# Patient Record
Sex: FEMALE | Race: BLACK OR AFRICAN AMERICAN | NOT HISPANIC OR LATINO | Employment: OTHER | URBAN - METROPOLITAN AREA
[De-identification: names, ages, dates, MRNs, and addresses within clinical notes are randomized per-mention and may not be internally consistent; named-entity substitution may affect disease eponyms.]

---

## 2018-07-09 ENCOUNTER — TELEPHONE (OUTPATIENT)
Dept: TRANSPLANT | Facility: CLINIC | Age: 68
End: 2018-07-09

## 2018-07-25 DIAGNOSIS — Z76.82 ORGAN TRANSPLANT CANDIDATE: Primary | ICD-10-CM

## 2018-08-09 ENCOUNTER — HOSPITAL ENCOUNTER (OUTPATIENT)
Dept: RADIOLOGY | Facility: HOSPITAL | Age: 68
Discharge: HOME | End: 2018-08-09
Attending: NURSE PRACTITIONER
Payer: MEDICARE

## 2018-08-09 ENCOUNTER — OFFICE VISIT (OUTPATIENT)
Dept: TRANSPLANT | Facility: CLINIC | Age: 68
End: 2018-08-09
Payer: MEDICARE

## 2018-08-09 VITALS
TEMPERATURE: 98 F | HEART RATE: 61 BPM | RESPIRATION RATE: 18 BRPM | SYSTOLIC BLOOD PRESSURE: 154 MMHG | HEIGHT: 67 IN | OXYGEN SATURATION: 100 % | BODY MASS INDEX: 27.2 KG/M2 | WEIGHT: 173.31 LBS | DIASTOLIC BLOOD PRESSURE: 67 MMHG

## 2018-08-09 DIAGNOSIS — Z01.818 PRE-TRANSPLANT EVALUATION FOR CHRONIC KIDNEY DISEASE: Primary | ICD-10-CM

## 2018-08-09 DIAGNOSIS — Z76.82 ORGAN TRANSPLANT CANDIDATE: ICD-10-CM

## 2018-08-09 DIAGNOSIS — N18.5 CKD (CHRONIC KIDNEY DISEASE), STAGE V: ICD-10-CM

## 2018-08-09 PROCEDURE — 97802 MEDICAL NUTRITION INDIV IN: CPT | Mod: S$GLB,TXP,, | Performed by: DIETITIAN, REGISTERED

## 2018-08-09 PROCEDURE — 71046 X-RAY EXAM CHEST 2 VIEWS: CPT | Mod: TC,TXP

## 2018-08-09 PROCEDURE — 93978 VASCULAR STUDY: CPT | Mod: 26,TXP,, | Performed by: RADIOLOGY

## 2018-08-09 PROCEDURE — 93978 VASCULAR STUDY: CPT | Mod: TC,TXP

## 2018-08-09 PROCEDURE — 71046 X-RAY EXAM CHEST 2 VIEWS: CPT | Mod: 26,TXP,, | Performed by: RADIOLOGY

## 2018-08-09 PROCEDURE — 72170 X-RAY EXAM OF PELVIS: CPT | Mod: TC,TXP

## 2018-08-09 PROCEDURE — 76770 US EXAM ABDO BACK WALL COMP: CPT | Mod: 26,TXP,, | Performed by: RADIOLOGY

## 2018-08-09 PROCEDURE — 76770 US EXAM ABDO BACK WALL COMP: CPT | Mod: TC,TXP

## 2018-08-09 PROCEDURE — 99244 OFF/OP CNSLTJ NEW/EST MOD 40: CPT | Mod: S$GLB,TXP,, | Performed by: TRANSPLANT SURGERY

## 2018-08-09 PROCEDURE — 99205 OFFICE O/P NEW HI 60 MIN: CPT | Mod: S$GLB,TXP,, | Performed by: INTERNAL MEDICINE

## 2018-08-09 PROCEDURE — 99999 PR PBB SHADOW E&M-EST. PATIENT-LVL V: CPT | Mod: PBBFAC,TXP,, | Performed by: INTERNAL MEDICINE

## 2018-08-09 PROCEDURE — 99204 OFFICE O/P NEW MOD 45 MIN: CPT | Mod: S$GLB,TXP,, | Performed by: PHYSICIAN ASSISTANT

## 2018-08-09 PROCEDURE — 72170 X-RAY EXAM OF PELVIS: CPT | Mod: 26,TXP,, | Performed by: RADIOLOGY

## 2018-08-09 RX ORDER — FERROUS SULFATE 325(65) MG
325 TABLET ORAL DAILY
COMMUNITY

## 2018-08-09 RX ORDER — TIZANIDINE HYDROCHLORIDE 4 MG/1
1 CAPSULE, GELATIN COATED ORAL DAILY
COMMUNITY

## 2018-08-09 RX ORDER — CHOLECALCIFEROL (VITAMIN D3) 25 MCG
5000 TABLET ORAL DAILY
COMMUNITY

## 2018-08-09 RX ORDER — FUROSEMIDE 40 MG/1
40 TABLET ORAL 2 TIMES DAILY
COMMUNITY

## 2018-08-09 RX ORDER — OXYCODONE AND ACETAMINOPHEN 7.5; 325 MG/1; MG/1
1 TABLET ORAL 2 TIMES DAILY PRN
COMMUNITY

## 2018-08-09 RX ORDER — SODIUM BICARBONATE 650 MG/1
650 TABLET ORAL 2 TIMES DAILY
COMMUNITY

## 2018-08-09 RX ORDER — PRAVASTATIN SODIUM 20 MG/1
20 TABLET ORAL NIGHTLY
COMMUNITY

## 2018-08-09 RX ORDER — CALCITRIOL 0.5 UG/1
0.5 CAPSULE ORAL DAILY
COMMUNITY

## 2018-08-09 RX ORDER — DULOXETIN HYDROCHLORIDE 60 MG/1
60 CAPSULE, DELAYED RELEASE ORAL DAILY
COMMUNITY

## 2018-08-09 RX ORDER — GABAPENTIN 600 MG/1
600 TABLET ORAL DAILY
COMMUNITY

## 2018-08-09 NOTE — PROGRESS NOTES
Transplant Surgery  Kidney Transplant Recipient Evaluation    Referring Physician: Aidan Dickson  Current Nephrologist: Aidan Dickson    Subjective:     Reason for Visit: evaluate transplant candidacy    History of Present Illness: Danielle Rm is a 68 y.o. year old female undergoing transplant evaluation.    Dialysis History: Danielle is pre-dialysis.      Transplant History: N/A    Etiology of Renal Disease: Diabetes Mellitus - Type Other / Unknown (based on medical records from referral).    Review of Systems   Constitutional: Negative for activity change, appetite change, chills, fatigue and fever.   HENT: Negative for sore throat and trouble swallowing.    Eyes: Negative for visual disturbance.   Respiratory: Negative for cough, chest tightness and shortness of breath.    Cardiovascular: Positive for leg swelling. Negative for chest pain and palpitations.   Gastrointestinal: Negative for abdominal distention, abdominal pain, blood in stool, constipation, diarrhea, nausea and vomiting.   Endocrine: Negative for polyuria.   Genitourinary: Negative for decreased urine volume, difficulty urinating, dysuria, flank pain, frequency and hematuria.   Musculoskeletal: Negative for gait problem, myalgias and neck stiffness.   Skin: Negative for rash and wound.   Neurological: Negative for dizziness, tremors, seizures, weakness, light-headedness and headaches.   Hematological: Negative for adenopathy.   Psychiatric/Behavioral: Negative for agitation, confusion and sleep disturbance.       Objective:     Physical Exam:  Constitutional:   Vitals reviewed: yes   Well-nourished and well-groomed: yes  Eyes:   Sclerae icteric: no   Extraocular movements intact: yes  GI:    Bowel sounds normal: yes   Tenderness: no    If yes, quadrant/location: not applicable   Palpable masses: no    If yes, quadrant/location: not applicable   Hepatosplenomegaly: no   Ascites: no   Hernia: no    If yes, type/location: not  applicable   Surgical scars: yes    If yes, type/location: midline (open gastric bypsass), paniculectomy  Resp:   Effort normal: yes   Breath sounds normal: yes    CV:   Regular rate and rhythm: yes   Heart sounds normal: yes   Femoral pulses normal: yes   Extremities edematous: yes  Skin:   Rashes or lesions present: no    If yes, describe:not applicable   Jaundice:: no    Musculoskeletal:   Gait normal: yes   Strength normal: yes  Psych:   Oriented to person, place, and time: yes   Affect and mood normal: yes    Additional comments: not applicable    Counseling: We provided Danielle Rm with a group education session today.  We discussed kidney transplantation at length with her, including risks, potential complications, and alternatives in the management of her renal failure.  The discussion included complications related to anesthesia, bleeding, infection, primary nonfunction, and ATN.  I discussed the typical postoperative course, length of hospitalization, the need for long-term immunosuppression, and the need for long-term routine follow-up.  I discussed living-donor and -donor transplantation and the relative advantages and disadvantages of each.  I also discussed average waiting times for both living donation and  donation.  I discussed national and center-specific survival rates.  I also mentioned the potential benefit of multicenter listing to candidates listed with centers within more than one organ procurement organization.  All questions were answered.    Final determination of transplant candidacy will be made once evaluation is complete and reviewed by the Kidney & Kidney/Pancreas Selection Committee.         Transplant Surgery - Candidacy   Assessment/Plan:   Danielle Rm is pre-dialysis with CKD stage 4 (GFR 15-29 mL/min). I see no surgical contraindication to placing a kidney transplant. Based on available information, Danielle Rm is a suitable kidney transplant candidate.      Sarah Hebert MD

## 2018-08-09 NOTE — PROGRESS NOTES
67 yo with biphasic iliacs bilaterally --> review with surgeon regarding need for non-contrast CT A/P

## 2018-08-09 NOTE — PROGRESS NOTES
Severe Extensive calcifications. Likely Prohibitive.     Need noncon CT pelvis and iliac doppler US.

## 2018-08-09 NOTE — PROGRESS NOTES
INITIAL PATIENT EDUCATION NOTE    Ms. Danielle Rm was seen in pre-kidney transplant clinic for evaluation for kidney, kidney/pancreas or pancreas only transplant.  The patient attended a group education session that discussed/reviewed the following aspects of transplantation: evaluation and selection committee process, [facility] waitlist management/multiple listings, types of organs offered (KDPI < 85%, KDPI > 85%, PHS increased risk, DCD), financial aspects, surgical procedures, dietary instruction pre- and post-transplant, health maintenance pre- and post-transplant, post-transplant hospitalization and outpatient follow-up, potential to participate in a research protocol, and medication management and side effects.  A question and answer session was provided after the presentation.    The patient was seen by all members of the multi-disciplinary team to include: Nephrologist/PA, Surgeon, , Transplant Coordinator, , Pharmacist and Dietician (if applicable).    The patient reviewed and signed all consents for evaluation which were witnessed and sent to scanning into the EPIC chart.    The patient was given an education book and plan for further evaluation based on her individual assessment.      The patient was encouraged to call with any questions or concerns.

## 2018-08-09 NOTE — PROGRESS NOTES
TRANSPLANT RECIPIENT EVALUATION    Requesting Physician: Dr. Randolph IBARRA       CC:   Initial evaluation of kidney transplant candidacy.    HPI:  Ms. Rm is a 68 y.o. year old Black or  female who has presented to be evaluated as a potential kidney transplant recipient.  She has advanced kidney disease secondary to diabetic nephropathy (biopsy proven). Patient is currently pre-dialysis. She has no access for dialysis access.  She urinates ~5-6 times a day. Her PMH is significant for DM X years ( improved after gastric bypass in 2004, on no med), anemia, low back pain s/p spinal cord stimulator, rotator cuff rupture,  DVT 2004 on coumadin, eliqius, She has cosmetic surgery (pinectomy) in Feb 2018, eliquis was held and patient developed the second DVT, started on Lovenox but developed bleeding, she required blood transfusion. So IVC filter was placed. She is off blood thinner med at this point.  Patient denies any history of coronary artery disease, stroke, seizure disorder, chronic obstructive pulmonary disease, liver disease,  pulmonary embolism, recurrent urinary tract infections or malignancies. she denies any CP, SOB orthopnea, nausea, vomiting, diarrhea, abdominal pain, cough, fever, chills, weight loss or night sweats. she has + numbness or paresthesias      Functional Status: She walks 4-5 blocks without any symptoms of chest pain, SOB, claudication.   Previous Transplant: no  Previous Blood Transfusion: yes  Previous Pregnancy: 4  Anticoagulation/ antiplatelet therapy and reason: no at this point  Potential Donor: yes, her son  High KDPI candidate: yes      Past Medical History:  Past Medical History:   Diagnosis Date    Anemia     Clotting disorder 2004    left lower ext    Diabetes mellitus, type 2     went away after gastric bypass    Disorder of kidney and ureter 2013    kidney biopsy: Diabetic glomerulopathy    DVT (deep venous thrombosis)     2004, 2018. Off blood thiner  med after bleeding    Low back pain     Metabolic bone disease        Past Surgical History:  Past Surgical History:   Procedure Laterality Date    CHOLECYSTECTOMY      GASTRIC BYPASS  2004    LENKA FILTER PLACEMENT  2018    HYSTERECTOMY  1985    SHOULDER SURGERY Bilateral 1998    Rotator cuff rupture    SPINAL CORD STIMULATOR IMPLANT  2015    for spinal cord stenosis         Family History:  Family History   Problem Relation Age of Onset    Cancer Mother         Ovarian Ca    Cancer Father         Prostate Ca    Diabetes Sister     Hypertension Sister     Kidney disease Brother     Heart disease Brother        Social History:  Social History     Social History    Marital status:      Spouse name: N/A    Number of children: 4    Years of education: N/A     Occupational History    Not on file.     Social History Main Topics    Smoking status: Never Smoker    Smokeless tobacco: Never Used    Alcohol use No    Drug use: No    Sexual activity: No     Other Topics Concern    Not on file     Social History Narrative    No narrative on file           Current Medication  Current Outpatient Prescriptions   Medication Sig Dispense Refill    calcitRIOL (ROCALTROL) 0.5 MCG Cap Take 0.5 mcg by mouth once daily.      CYANOCOBALAMIN, VITAMIN B-12, INJ Inject as directed. One injection monthly      DULoxetine (CYMBALTA) 60 MG capsule Take 60 mg by mouth once daily.      ferrous sulfate 325 mg (65 mg iron) Tab tablet Take 325 mg by mouth once daily.      furosemide (LASIX) 40 MG tablet Take 40 mg by mouth 2 (two) times daily.      gabapentin (NEURONTIN) 600 MG tablet Take 600 mg by mouth once daily.      oxyCODONE-acetaminophen (PERCOCET) 7.5-325 mg per tablet Take 1 tablet by mouth 2 (two) times daily as needed for Pain.      pravastatin (PRAVACHOL) 20 MG tablet Take 20 mg by mouth every evening.      sodium bicarbonate 650 MG tablet Take 650 mg by mouth 2 (two) times daily.       tiZANidine 4 mg Cap Take 1 capsule by mouth once daily.      vitamin D 1000 units Tab Take 5,000 Units by mouth once daily.      diphth,pertus,acell,,tetanus (BOOSTRIX) 2.5-8-5 Lf-mcg-Lf/0.5mL Susp Inject 0.5 mLs into the muscle once. for 1 dose 0.5 mL 0     No current facility-administered medications for this visit.        Allergy:       Review of Systems    Constitution: Positive for decreased appetite. Negative for chills, fever, weakness  Cardiovascular: Positive for leg swelling. Negative for chest pain and palpitations.   Respiratory: Negative for cough, hemoptysis, shortness of breath, sputum production and wheezing.    Endocrine: Negative for cold intolerance and heat intolerance.   Hematologic/Lymphatic: Negative for adenopathy. Does not bruise/bleed easily.   Skin: Negative for dry skin, itching, rash and suspicious lesions.   Musculoskeletal: Positive for back pain. Negative for joint pain, myalgias and neck pain.   Gastrointestinal: Negative for abdominal pain, constipation, diarrhea, heartburn, nausea and vomiting.   Genitourinary: Negative for dysuria, frequency, hematuria, hesitancy and urgency.   Neurological: Positive for paresthesias. Negative for dizziness, headaches and numbness.   Psychiatric/Behavioral: Negative for depression and memory loss. The patient does not have insomnia and is not nervous/anxious.    Allergic/Immunologic: Negative for environmental allergies, HIV exposure, hives and persistent infections.       OBJECTIVE       Body mass index is 27.13 kg/m².    Vitals:    08/09/18 0808   BP: (!) 154/67   Pulse: 61   Resp: 18   Temp: 97.9 °F (36.6 °C)       Physical Exam    Constitutional: She is oriented to person, place, and time. She appears well-developed and well-nourished. No distress.   Mouth/Throat: She has denture. No oral lesions.   Neck: Normal range of motion. Neck supple.   Cardiovascular: Normal rate, regular rhythm and normal heart sounds.  Exam reveals no gallop and no  friction rub.    No murmur heard.  Pulmonary/Chest: Effort normal and breath sounds normal. No respiratory distress. She has no wheezes. She has no rales.   Abdominal: Soft. Bowel sounds are normal. She exhibits no distension and no mass. There is no tenderness. There is no guarding.   Musculoskeletal: She exhibits +++ edema  Neurological: She is alert and oriented to person, place, and time.   Skin: Skin is warm and dry. She is not diaphoretic.   Psychiatric: She has a normal mood and affect. Her behavior is normal.         Labs: Reviewed with the patient    ASSESSMENT     1. Pre-transplant evaluation for chronic kidney disease    2. CKD (chronic kidney disease), stage V    3. Organ transplant candidate        PLAN     Transplant Candidacy:   After obtaining history and performing physical exam as well as reviewing available diagnostic studies, Ms. Rm is a suitable kidney transplant candidate.  Final determination of transplant candidacy will be made once workup is complete and reviewed by the selection committee.    Prior to Listing, will need the following items to be completed:  1. Standard serologies, cardiac and imaging studies   2. Cardiology  3. Obtain the record for her recent DVT and bleeding after cosmetic surgery  4. Hematology clearance regarding the plan for initiation of anticoag med ( she already follows with her hematologist)  5. PTH

## 2018-08-09 NOTE — PROGRESS NOTES
Transplant Recipient Adult Psychosocial Assessment    Danielle Rm  [address] [address]    Telephone Information:   Mobile [phone]   Home  [phone] (home)  Work  [phone] (work)  E-mail  [email or web address]    Sex: female  YOB: 1950  Age: 68 y.o.    Encounter Date: 8/9/2018  [place] Citizen: yes  Primary Language: English   Needed: no    Emergency Contact:  Name: Vu Rm  Relationship: spouse  Address: [address] [place] [address]  Phone Numbers:  [phone] (mobile)    Family/Social Support:   Number of dependents/: None   Marital history:  for 38 yrs  Other family dynamics: 3 sons; Angelo PATRICIA Rm (ph# [phone]), Rittman PATRICIA Rm (ph# [phone]) and Yonas Jag (ph# [phone]). Pt reported having siblings but that they won't be helping out with the transplant process since they aren't local. Pt reported son Fabian is located in [place] and will help out as needed.     Household Composition:  Name: Vu Rm  Age: did not assess  Relationship: spouse  Does person drive? yes    Do you and your caregivers have access to reliable transportation? yes  PRIMARY CAREGIVER: Vu Rm, , will be primary caregiver, phone number [phone].      provided in-depth information to patient and caregiver regarding pre- and post-transplant caregiver role.   strongly encourages patient and caregiver to have concrete plan regarding post-transplant care giving, including back-up caregiver(s) to ensure care giving needs are met as needed.    Patient and Caregiver states understanding all aspects of caregiver role/commitment and is able/willing/committed to being caregiver to the fullest extent necessary.    Patient and Caregiver verbalizes understanding of the education provided today and caregiver responsibilities.         remains available. Patient and Caregiver agree to contact   in a timely manner if concerns arise.      Able to take time off work without financial concerns: yes.     Additional Significant Others who will Assist with Transplant:  Name: Yonas Rm   Age: did not assess  City: [place] State: [place]  Relationship: son  Does person drive? yes    Living Will: no  Healthcare Power of : no  Advance Directives on file: <<no information> per medical record.  Verbally reviewed LW/HCPA information.   provided patient with copy of LW/HCPA documents and provided education on completion of forms.    Living Donors: Yes.  Name: Angeloelis Rm and Toribio Rm are interested in donating.    Highest Education Level: Associate/Bachelor Degree  Reading Ability: college  Reports difficulty with: seeing; Pt wears contacts  Learns Best By:  A combination of verbal and hands on instruction.     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   Patient is self employed, see above..    Spouse/Significant Other Employment:  is retired from being a .     Disabled: no    Monthly Income:  Salary/Wages: $2,100 but pt reports it might be less when taking into consideration her premium for Medicare.  reported receiving $1,339 monthly from jail.   Able to afford all costs now and if transplanted, including medications: yes  Patient and Caregiver verbalizes understanding of personal responsibilities related to transplant costs and the importance of having a financial plan to ensure that patients transplant costs are fully covered.      provided fundraising information/education.  Patient and Caregiver verbalizes understanding.   remains available.    Insurance:   Payor/Plan Subscr  Sex Relation Sub. Ins. ID Effective Group Num   1. HU[employer] MANAGE*  KAYLAH KAMINSKI 1950 Female  [id number] 1/1/18 [id number]                                   [address] [address]     Primary Insurance (for UNOS reporting): Public Insurance - Medicare FFS (Fee For Service)  Secondary Insurance (for UNOS reporting): None  Patient and Caregiver verbalizes clear understanding that patient may experience difficulty obtaining and/or be denied insurance coverage post-surgery. This includes and is not limited to disability insurance, life insurance, health insurance, burial insurance, long term care insurance, and other insurances.    Patient and Caregiver also reports understanding that future health concerns related to or unrelated to transplantation may not be covered by patient's insurance.  Resources and information provided and reviewed.      Patient and Caregiver provides verbal permission to release any necessary information to outside resources for patient care and discharge planning.  Resources and information provided are reviewed.      Dialysis Adherence:  Patient reports adherence to nephrologist, Dr. Dickson, medical plan and appointments.  Pt is not on dialysis.     Infusion Service: patient utilizing? no  Home Health: patient utilizing? yes Pt couldn't recall the name of the [employer] company but reported a nurse coming out for 8 weeks, ending in Feb, for wound care  DME: yes; cane, walker and shower chair but pt not currently using  Pulmonary/Cardiac Rehab: none   ADLS:  Pt reports being independent with all ADLS.    Adherence:   Pt reports high adherence to nephrologist. SWI called nephrologist's office to confirm but had to leave voicemail.  Adherence education and counseling provided.     Per History Section:  Past Medical History:   Diagnosis Date    Anemia     Clotting disorder 2004    left lower ext    Diabetes mellitus, type 2     went away after gastric bypass    Disorder of kidney and ureter 2013    kidney biopsy: Diabetic glomerulopathy    DVT (deep venous thrombosis)      2004, 2018. Off blood thiner med after bleeding    Low back pain     Metabolic bone disease      Social History   Substance Use Topics    Smoking status: Never Smoker    Smokeless tobacco: Never Used    Alcohol use No     History   Drug Use No     History   Sexual Activity    Sexual activity: No       Per Today's Psychosocial:  Tobacco: none, patient denies any use.  Alcohol: none, patient denies any use.  Illicit Drugs/Non-prescribed Medications: none, patient denies any use.    Patient states clear understanding of the potential impact of substance use as it relates to transplant candidacy and is aware of possible random substance screening.  Substance abstinence/cessation counseling, education and resources provided and reviewed.     Arrests/DWI/Treatment/Rehab: patient denies    Psychiatric History:    Mental Health: depression and anxiety  Psychiatrist/Counselor: Pt reports seeing a psychiatrist in the past  Medications:  none  Suicide/Homicide Issues: none   Safety at home: none    Knowledge: Patient states having clear understanding and realistic expectations regarding the potential risks and potential benefits of organ transplantation and organ donation, agrees to discuss with health care team members and support system members and to utilize available resources and express questions and/or concerns in order to further facilitate the pt informed decision-making.  Resources and information provided and reviewed.     Patient and Caregiver is aware of [facility]'s affiliation and/or partnership with agencies in home health care, LTAC, SNF, [facility], and other hospitals and clinics.    Understanding: Patient and Caregiver reports having a clear understanding of the many lifetime commitments involved with being a transplant recipient, including costs, compliance, medications, lab work, procedures, appointments, concrete and financial planning, preparedness, timely and appropriate communication of concerns,  "abstinence (ETOH, tobacco, illicit non-prescribed drugs), adherence to all health care team recommendations, support system and caregiver involvement, appropriate and timely resource utilization and follow-through, mental health counseling as needed/recommended, and patient and caregiver responsibilities.  Social Service Handbook, resources and detailed educational information provided and reviewed.  Educational information provided.    Patient and Caregiver also reports current and expected compliance with health care regime and states having a clear understanding of the importance of compliance.      Patient and Caregiver reports a clear understanding that risks and benefits may be involved with organ transplantation and with organ donation.      Patient and Caregiver also reports clear understanding that psychosocial risk factors may affect patient, and include but are not limited to feelings of depression, generalized anxiety, anxiety regarding dependence on others, post traumatic stress disorder, feelings of guilt and other emotional and/or mental concerns, and/or exacerbation of existing mental health concerns.  Detailed resources provided and discussed.     Patient and Caregiver agrees to access appropriate resources in a timely manner as needed and/or as recommended, and to communicate concerns appropriately.  Patient and Caregiver also reports a clear understanding of treatment options available.      reviewed education, provided additional information, and answered questions.    Feelings or Concerns: Pt reported concerns with not wanting to go on dialysis and being denied a kidney. SW normalized pt's feelings, provided support and educated pt on the process. Pt reported wanting to stay in the hospital for 6 day so her insurance would "cover everything". UMANG educated pt about how it's best to not be in the hospital after transplant to avoid jimmie any illnesses.     Coping: Pt reported " coping adequately with the help of listening to the gospel, confiding in her  and going to the [employer] and [facility].     Goals: Pt reported wanting to maintain her kidney, stay healthy and be with her grand babies.  Patient referred to Vocational Rehabilitation.    Interview Behavior: Patient and Caregiver presents as alert and oriented x 4, pleasant, well groomed, recall good, concentration/judgement good, average intelligence, calm, communicative, cooperative and asking and answering questions appropriately. Pt maintained good eye contact but pt's  would often keep his eyes closed during the assessment, but he still presented as alert and oriented as he would open his eyes to ask questions or clarify things.           Transplant Social Work - Candidacy  Assessment/Plan:     Psychosocial Suitability: Patient presents as a suitable candidate for kidney transplant at this time. Based on psychosocial risk factors, patient presents as low risk, due to having anxiety and depression under control, good support, caregiver plan, lodging, and transportation plans in place as well as reported current adequate funding.     Recommendations/Additional Comments: Pt resides over 1 hour away, local lodging options discussed. SWI recommends lodging at [employer] apartments. Fundraising options discussed and encouraged to assist pt with pay for cost of medications, food, gas and other transplant related needs. SWI recommends that pt remain aware of potential mental health concerns and contact the team if any concerns arise. SWI remains available to answer any question or concerns that arise as the pt moves through the transplant process. Assessment completed with supervision by SILVA Vinson.     Zulema Hill, MSW Intern

## 2018-08-09 NOTE — LETTER
August 9, 2018        Aidan Dickson  [address] [facility] [address]  Phone: [phone]  Fax: [phone]             [facility]  [address]  [facility] [address]  Phone: [phone]   Patient: Danielle Rm   MR Number: [id number]   YOB: 1950   Date of Visit: 8/9/2018       Dear Dr. Aidan Dickson    Thank you for referring Danielle Rm to me for evaluation. Attached you will find relevant portions of my assessment and plan of care.    If you have questions, please do not hesitate to call me. I look forward to following Danielle Rm along with you.    Sincerely,    Taryn Araya MD    Enclosure    If you would like to receive this communication electronically, please contact [email or web address] or [phone] to request V[employer]io Link access.    [employer] Link is a tool which provides read-only access to select patient information with whom you have a relationship. Its easy to use and provides real time access to review your patients record including encounter summaries, notes, results, and demographic information.    If you feel you have received this communication in error or would no longer like to receive these types of communications, please e-mail [email or web address]

## 2018-08-09 NOTE — PROGRESS NOTES
PHARM.D. PRE-TRANSPLANT NOTE:    This patient's medication therapy was evaluated as part of her pre-transplant evaluation.    The following pharmacologic concerns were noted: none      Current Outpatient Prescriptions   Medication Sig Dispense Refill    calcitRIOL (ROCALTROL) 0.5 MCG Cap Take 0.5 mcg by mouth once daily.      CYANOCOBALAMIN, VITAMIN B-12, INJ Inject as directed. One injection monthly      DULoxetine (CYMBALTA) 60 MG capsule Take 60 mg by mouth once daily.      ferrous sulfate 325 mg (65 mg iron) Tab tablet Take 325 mg by mouth once daily.      furosemide (LASIX) 40 MG tablet Take 40 mg by mouth 2 (two) times daily.      gabapentin (NEURONTIN) 600 MG tablet Take 600 mg by mouth once daily.      oxyCODONE-acetaminophen (PERCOCET) 7.5-325 mg per tablet Take 1 tablet by mouth 2 (two) times daily as needed for Pain.      pravastatin (PRAVACHOL) 20 MG tablet Take 20 mg by mouth every evening.      sodium bicarbonate 650 MG tablet Take 650 mg by mouth 2 (two) times daily.      tiZANidine 4 mg Cap Take 1 capsule by mouth once daily.      vitamin D 1000 units Tab Take 5,000 Units by mouth once daily.       No current facility-administered medications for this visit.          Currently Ms. Rm and  is responsible for preparing / administering this patient's medications on a daily basis.  I am available for consultation and can be contacted, as needed by the other members of the Kidney Transplant team.

## 2018-08-09 NOTE — PROGRESS NOTES
Pre Transplant Infectious Diseases Consult  Kidney Transplant Recipient Evaluation    Requesting Physician: Aidan Dickson MD    Reason for Visit:    Chief Complaint   Patient presents with    Kidney Transplant Evaluation     History of Present Illness  Danielle Rm is a 68 y.o. year old Black or  female with advanced Kidney disease currently being evaluated for Kidney transplant.  She is not yet on HD. Many years ago had kidney infection that was treated without recurrence. Patient denies any recent fever, chills, or infective illnesses.      1) Do you have a history of:   YES NO   Diabetes      [x] [x] Resolved after gastric bypass  Wound/ Foot Infection/Bone Infection   []        [x]     Surgical Removal of Spleen   []  [x]                  2) Have you had recurrent infections involving:             YES NO  Sinus infections  []         [x]   Sore Throat   []         [x]                 Prostate Infections  []         []              Bladder Infections  []         [x]                     Kidney Infections  []         [x]                               Intestinal Infections  []         [x]      Skin Infections   []         [x]       Reproductive Infections          []  [x]   Periodontal Disease  []         [x]        3)Have you ever had: YES     NO UNKNOWN      Chicken Pox   [x]         []  []   Shingles   []         [x]  []   Orolabial Herpes             []  [x]  []   Genital Herpes  []         [x]  []   Cytomegalovirus  []         [x]  []   Xander-Barr Virus  []         [x]  []   Genital Warts   []         [x]  []   Hepatitis A   []         [x]  []   Hepatitis B   []         [x]  []   Hepatitis C   []         [x]  []   Syphilis   []         [x]  []   Gonorrhea   []         [x]  []   Pelvic Inflammatory   Disease   []         [x]  []   Chlamydia Infection  []         [x]  []   Intestinal parasites   or worms   []         [x]  []   Fungal Infections  []         [x]  []   Blood Infections  []          [x]  []       Comment:       4) Have you ever been exposed   YES NO  To someone with tuberculosis?  []   [x]   If yes, what treatment did you receive:     5) What states have you lived in? [place], [place]    6) What countries have you visited for more than 2 weeks?    None                     YES NO  7) Did you have any associated infections?  []  [x]       8) Are you planning to travel outside the    [x]  []   [place] after your transplant?    9) Household                   YES NO  Do you have pets living in your house?    []         [x]   If yes, describe:     Do you spend time or live on a farm or     []         [x]   have livestock or other farm animals?  If yes, which ones:    Do you have a fish tank?          []  [x]       Do you have a litter box?      []         [x]     Do you fish or hunt?       [x]         []     Do you clean or skin fish or animals?    []         [x]     Do you consume raw or undercooked    []         [x]   meat, fish, or shellfish?      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          12)Do you garden or otherwise  YES NO   work in the soil?    [x]         []   13)Do you hike, camp, or spend     time in wooded areas?   []         [x]        14) The patient's immunization history was reviewed.    Have you ever received:  YES NO UNKNOWN DATES   Routine Childhood vaccines  [x]         []  []      Influenza vaccine   [x]  []  []    Pneumovax    [x]  []  [] approx 1 year    Tetanus-diptheria   []         [x]  []    Hepatitis A vaccine series       []  [x]  []    Hepatitis B vaccine series         []  [x]  []    Meningitis vaccine   []         [x]  []    Varicella vaccine   [x]         []  []        Based on the patients immunization history and serologies, immunizations were ordered:         Ordered  Not Ordered  Influenza Vaccine     []    []   Hepatitis A series at 0,  6 months   [x]    []   Hepatitis B seriesat 0, 1, and 6  months  []    [x]   Hepatitis B High Dose 0,1, and 6 months  []    []   Prevnar      [x]    []   Pneumovax      []    []    TDap       [x]    []    Zoster       []    [x]   Menactra      []    [x]            The patient was encouraged to contact us about any problems that may develop after immunization and possible side effects were reviewed.      Previous Transplant: no    Etiology of Kidney Disease: unknown etiology    Allergies: Patient has no known allergies.    There is no immunization history on file for this patient.  History reviewed. No pertinent past medical history.  Past Surgical History:   Procedure Laterality Date    GASTRIC BYPASS        Social History     Social History    Marital status:      Spouse name: N/A    Number of children: N/A    Years of education: N/A     Occupational History    Not on file.     Social History Main Topics    Smoking status: Not on file    Smokeless tobacco: Not on file    Alcohol use Not on file    Drug use: Unknown    Sexual activity: Not on file     Other Topics Concern    Not on file     Social History Narrative    No narrative on file       Review of Systems   Constitution: Positive for decreased appetite. Negative for chills, fever, weakness, malaise/fatigue, night sweats, weight gain and weight loss.   HENT: Negative for congestion, ear pain, hearing loss, hoarse voice, sore throat and tinnitus.    Eyes: Negative for blurred vision, redness and visual disturbance.   Cardiovascular: Positive for leg swelling. Negative for chest pain and palpitations.   Respiratory: Negative for cough, hemoptysis, shortness of breath, sputum production and wheezing.    Endocrine: Negative for cold intolerance and heat intolerance.   Hematologic/Lymphatic: Negative for adenopathy. Does not bruise/bleed easily.   Skin: Negative for dry skin, itching, rash and suspicious lesions.   Musculoskeletal: Positive for back pain. Negative for joint pain, myalgias and neck  pain.   Gastrointestinal: Negative for abdominal pain, constipation, diarrhea, heartburn, nausea and vomiting.   Genitourinary: Positive for flank pain. Negative for dysuria, frequency, hematuria, hesitancy and urgency.   Neurological: Positive for paresthesias. Negative for dizziness, headaches and numbness.   Psychiatric/Behavioral: Negative for depression and memory loss. The patient does not have insomnia and is not nervous/anxious.    Allergic/Immunologic: Negative for environmental allergies, HIV exposure, hives and persistent infections.     Physical Exam   Constitutional: She is oriented to person, place, and time. She appears well-developed and well-nourished. No distress.   HENT:   Head: Normocephalic and atraumatic.   Mouth/Throat: She has dentures (lower partial). No oral lesions. Abnormal dentition (some missing ). Dental caries (filled) present. No dental abscesses or lacerations.   Eyes: EOM are normal. Pupils are equal, round, and reactive to light.   Neck: Normal range of motion. Neck supple.   Cardiovascular: Normal rate, regular rhythm and normal heart sounds.  Exam reveals no gallop and no friction rub.    No murmur heard.  Pulmonary/Chest: Effort normal and breath sounds normal. No respiratory distress. She has no wheezes. She has no rales.   Abdominal: Soft. Bowel sounds are normal. She exhibits no distension and no mass. There is no tenderness. There is no guarding.   Musculoskeletal: She exhibits no edema (LE 4+ bl pitting).   Neurological: She is alert and oriented to person, place, and time.   Skin: Skin is warm and dry. She is not diaphoretic.   Psychiatric: She has a normal mood and affect. Her behavior is normal.     Diagnostics: No results found for: RPR  No results found for: CMVANTIBODIE  No results found for: HIV1X2  No results found for: HTLVIIIANTIB  No results found for: HEPBSAG  No results found for: HEPBCAB  No results found for: HEPCAB  No results found for: TOXOIGG  No  components found for: TOXOIGGINTER  No results found for: PLU6MUA  No results found for: ZZV0JDM  No results found for: VARICELLAZOS  No results found for: VARICELLAINT  No results found for: STRONGANTIGG  No results found for: EPSTEINBARRV  No results found for: HEPBSAB  No results found for: QUANTIFERON  No results found for: HEPAIGM  No results found for: PPD           Transplant Infectious Diseases - Candidacy    Assessment/Plan:     Transplant Candidacy: Based on available information, there are no identified significant barriers to transplantation from an infectious disease standpoint pending acceptable serologies.  Refer to ID clinic for any serologies that require further evaluation. Final determination of transplant candidacy will be made once evaluation is complete and reviewed by the Transplant Selection Committee.    BEL Mcneill  Vaccine Needs:  1. Hep A today and 6 months - rx given for second dose  2. High dose Hep B series - Rx given  3. Prevnar today  4. Tdap today       Counseling:I discussed with Danielle the risk for increased susceptibility to infections following transplantation including increased risk for infection right after transplant and if rejection should occur.  The patients has been counseled on the importance of vaccinations including but not limited to a yearly flu vaccine.  Specific guidance has been provided to the patient regarding the patients occupation, hobbies and activities to avoid future infectious complications including but not limited to avoiding undercooked meats and seafood, proper hygiene, and contact with animals.

## 2018-08-09 NOTE — PROGRESS NOTES
TRANSPLANT NUTRITIONAL ASSESSMENT    Referring Provider: Taryn Araya MD    Reason for Visit: Pre-kidney transplant work-up (pre-dialysis)    Age: 68 y.o.  Sex: female    Patient Active Problem List   Diagnosis    CKD (chronic kidney disease), stage V     Past Medical History:   Diagnosis Date    Anemia     Clotting disorder 2004    left lower ext    Diabetes mellitus, type 2     went away after gastric bypass    Disorder of kidney and ureter 2013    kidney biopsy: Diabetic glomerulopathy    DVT (deep venous thrombosis)     2004, 2018. Off blood thiner med after bleeding    Low back pain     Metabolic bone disease      Lab Results   Component Value Date    GLU 87 08/09/2018    K 5.5 (H) 08/09/2018    PHOS 5.7 (H) 08/09/2018    CHOL 123 08/09/2018    HDL 70 08/09/2018    TRIG 29 (L) 08/09/2018    ALBUMIN 3.3 (L) 08/09/2018    HGBA1C 5.0 08/09/2018    CALCIUM 8.4 (L) 08/09/2018       Current Outpatient Prescriptions   Medication Sig    calcitRIOL (ROCALTROL) 0.5 MCG Cap Take 0.5 mcg by mouth once daily.    CYANOCOBALAMIN, VITAMIN B-12, INJ Inject as directed. One injection monthly    DULoxetine (CYMBALTA) 60 MG capsule Take 60 mg by mouth once daily.    ferrous sulfate 325 mg (65 mg iron) Tab tablet Take 325 mg by mouth once daily.    furosemide (LASIX) 40 MG tablet Take 40 mg by mouth 2 (two) times daily.    gabapentin (NEURONTIN) 600 MG tablet Take 600 mg by mouth once daily.    oxyCODONE-acetaminophen (PERCOCET) 7.5-325 mg per tablet Take 1 tablet by mouth 2 (two) times daily as needed for Pain.    pravastatin (PRAVACHOL) 20 MG tablet Take 20 mg by mouth every evening.    sodium bicarbonate 650 MG tablet Take 650 mg by mouth 2 (two) times daily.    tiZANidine 4 mg Cap Take 1 capsule by mouth once daily.    vitamin D 1000 units Tab Take 5,000 Units by mouth once daily.    diphth,pertus,acell,,tetanus (BOOSTRIX) 2.5-8-5 Lf-mcg-Lf/0.5mL Susp Inject 0.5 mLs into the muscle once. for 1 dose     No  "current facility-administered medications for this visit.      Allergies: Patient has no known allergies.    Ht Readings from Last 1 Encounters:   08/09/18 5' 7.01" (1.702 m)     Wt Readings from Last 1 Encounters:   08/09/18 78.6 kg (173 lb 4.5 oz)      BMI: Body mass index is 27.13 kg/m².      Weight Change/Time: reports wt stable recently  Current Diet: limits intake of foods high in K, phos, Na, pt also limits dairy due to lactose intolerance  Appetite/Current Intake: fair but improving now per pt  Exercise/Physical Activity: no exercise regimen, tries to stay active by walking and doing chores at home  Nutritional/Herbal Supplements: B12, vitamin D3, iron  Chewing/Swallowing Problems: none reported  Symptoms: reports occasional nausea and dry heaves, takes PeptoBismol to help relieve    Estimated Kcal Need: 1950kcals/day (25kcals/kg BW)  Estimated Protein Need: 62gms protein/day (0.8gms/kg BW)    Nutritional History: pt is here today with her .  Does own meal preparation and grocery shopping.   Uses salt in cooking but states she does not add salt to foods after prepared.  Dining out/fast food: typically twice per week at a local restaurant or fast food hamburger or fried chicken.  Pt states she enjoys fruit especially watermelon and will consume  1/2-1 watermelon per day-discussed K content of watermelon and encouraged to decrease portion of 1-2 cups per day.  Diet Recall: B: usually skips breakfast, Mid-morning snack: watermelon, strawberries, peach, apple, or grapes, L: spaghetti, meatsauce, green beans OR fast food hamburger OR 2 pieces fried chicken, fried okra, corn, afternoon snack: fresh fruit, D: fresh fruit OR salad with ham and Shannon dressing OR meat, starch, vegetable, Evening snack: fresh fruit.  Beverages: ice water, Sprite, strawberry soda.  Pt with questions re: which milk alternative that she can consume that are lactose free and low in K and phos-advised rice milk or unsweetened " original almond milk are options.    Nutritional Diagnoses  Problem: food- and nutrition-related knowledge deficit  Etiology: related to limited previous education on renal diet guidelines-potassium content of fruits and vegetables  Symptoms: as evidenced by pt report and diet recall.    Educational Need? yes  Barriers: none identified-pt motivated to learn   Discussed with: patient and   Interventions: Patient taught nutrition information regarding   -Renal Nutrition Therapy packet reviewed ( high/low food sources of K, Phos and protein, low sodium and fluid intake, emphasis on moderate protein intake)   Goals/Recommendations: diet adherence, choose healthy options when dining out and limit high potassium foods  Actions Taken: instruct/provide written information  Patient and/or family comprehend instructions: yes  Outcome: Verbalizes understanding  Monitoring: contact information provided, will follow-up as needed      Counseling Time: 15 minutes

## 2018-08-10 ENCOUNTER — TELEPHONE (OUTPATIENT)
Dept: TRANSPLANT | Facility: CLINIC | Age: 68
End: 2018-08-10

## 2018-08-10 NOTE — TELEPHONE ENCOUNTER
Nephrology Adherence:    Adela, transplant coordinator, at pt's nephrology office returned phone call but when SWI tried to call her back she didn't answer. SWI left voicemail.     SWI remains available at [phone].

## 2018-08-10 NOTE — TELEPHONE ENCOUNTER
"Psychiatric History Update:    Mental Health: depression and anxiety. Pt reported feeling "down" and anxious about not wanting to get on dialysis. Pt reported listening to the gospel when she starts to feel this way. SW provided support, normalization and education about the process.   Psychiatrist/Counselor: Pt reports seeing a psychiatrist in the past. Pt couldn't recall name of psych but agreed to visit a psych again if the doctor recommended it.       "

## 2018-08-15 ENCOUNTER — TELEPHONE (OUTPATIENT)
Dept: TRANSPLANT | Facility: CLINIC | Age: 68
End: 2018-08-15

## 2018-08-15 NOTE — TELEPHONE ENCOUNTER
Letter sent to obtain CT abdomen and pelvis without contrast locally due to severe calcifications noted by Dr. Dawson on Pelvis X-Ray. He stated she may not be surgical candidate. Also requested for films to be sent.

## 2018-08-21 ENCOUNTER — TELEPHONE (OUTPATIENT)
Dept: TRANSPLANT | Facility: CLINIC | Age: 68
End: 2018-08-21

## 2018-08-21 NOTE — TELEPHONE ENCOUNTER
Returned call to Emi, nurse with Dr. Abhishek Aviles' office (Cardiology). We requested cardiac testing and clearance for kidney transplant surgery. Left message for Emi.

## 2018-09-10 ENCOUNTER — TELEPHONE (OUTPATIENT)
Dept: TRANSPLANT | Facility: CLINIC | Age: 68
End: 2018-09-10

## 2018-09-10 NOTE — TELEPHONE ENCOUNTER
"Spoke to patient, states she has not yet been scheduled to get her CT scan. Her Urologist also wants it for renal mass. She states her doctor is "taking a while to get me scheduled". She will let Monica know once scheduled. She is also due for a repeat mammo at the end of September. Pt states she had stress test and echo done at [facility] in [place] on 8/17/18. She will get them to fax reports to transplant and Dr. Aviles for cardiac clearance letter. Pt to schedule GYN exam. She thought since she had hysterectomy she did not need pap. Explained she will still need pelvic exam. Answered all questions.   "

## 2018-09-10 NOTE — TELEPHONE ENCOUNTER
----- Message from Bettina Welch sent at 9/10/2018  9:48 AM CDT -----  Contact: Patient  Needs Advice    Reason for call:   Patient states she received Vaccinations and needs verification.      Communication Preference: [phone]  Additional Information: n/a

## 2018-09-10 NOTE — TELEPHONE ENCOUNTER
----- Message from Suresh Wheeler sent at 9/10/2018 10:54 AM CDT -----  Contact: Pt  Needs Advice    Reason for call: Pt calling to speak with Ursula in regards to seeing if she has release of information form in her folder.       Communication Preference: [phone]  Additional Information: N/A

## 2018-09-13 ENCOUNTER — TELEPHONE (OUTPATIENT)
Dept: TRANSPLANT | Facility: CLINIC | Age: 68
End: 2018-09-13

## 2018-09-13 NOTE — TELEPHONE ENCOUNTER
Received call from Emi with Dr. Aviles (Cardiologist) stating that pt refused to have cardiac testing done there because she had it done in [place]. I returned her call, leaving a message, letter her know that patient must be going through a transplant evaluation in [facility] as well and that patient told me on Monday that she had a stress test and echo done at [facility] on 8/17/18. I asked patient to forward testing to Dr. Aviles to see if he can provide Cardiac Clearance. Requested call back from Emi to let me know if he is willing to clear her on outside records. If not, she will need to see Cardiologist in [place] where she had testing completed.

## 2018-11-19 ENCOUNTER — TELEPHONE (OUTPATIENT)
Dept: TRANSPLANT | Facility: CLINIC | Age: 68
End: 2018-11-19

## 2018-11-19 NOTE — TELEPHONE ENCOUNTER
Financial Plan Update:    SW followed up with pt regarding her financial plan. Pt reports that she feels that her financial plan in suitable at this time. SW directly questioned pt about her comment regarding staying in the hospital for 6 days so that her insurance will cvoer everything. Pt reports that SW Intern, MARIS Licona had already discussed this information with her and told her that if pt is medically stable to leave the hospital, pt's insurance may not cover any additional time in the hospital due to pt's choice to remain in the hospital. SW also asked about pt's medications. Pt reports that she talked to her insurance company and reports that her medications will be covered at a reasonable rate. Pt also reports having family members to assist if pt has any other financial needs.      Adherence Check:    SW attempted to complete and adherence check with pt's nephrology office and left a voice message.      Psychosocial Suitability: Patient continues presents as a suitable candidate for kidney transplant at this time. SW is awaiting an adherence check.

## 2018-11-20 ENCOUNTER — TELEPHONE (OUTPATIENT)
Dept: TRANSPLANT | Facility: CLINIC | Age: 68
End: 2018-11-20

## 2018-11-20 NOTE — TELEPHONE ENCOUNTER
Nephrology Adherence:     Adela, Certified MA/Transplant Coordinator at 's nephrology office reports over last three months that the patient has attended all of her appointments as scheduled, completes her labs, and takes her medications as prescribed. Adela reports no issues with caregivers, transportation, or any other psychosocial concerns.

## 2018-11-26 ENCOUNTER — TELEPHONE (OUTPATIENT)
Dept: TRANSPLANT | Facility: CLINIC | Age: 68
End: 2018-11-26

## 2018-11-26 NOTE — TELEPHONE ENCOUNTER
Spoke to patient, states she has recently seen Urology and had a kidney biopsy which shows she has cancer in the left kidney. Confirmed that she is still pre dialysis. Patient still needs CT abdomen/pelvis to assess vascular calcifications. Re-sent 8/15/18 letter to patient. She now will need CT scan (to see if she is surgical candidate for transplant) and Urology clearance for possible RCC.

## 2018-12-26 ENCOUNTER — TELEPHONE (OUTPATIENT)
Dept: TRANSPLANT | Facility: CLINIC | Age: 68
End: 2018-12-26

## 2018-12-26 NOTE — TELEPHONE ENCOUNTER
I spoke with Ms Rm about workup progress. She states she was admitted to the hospital to have nephrectomy of her kidney with RCC and the urologist decided to do cryotherapy on cancer lesion instead of nephrectomy to avoid pt needing dialysis. She is not yet scheduled but will call me once it is booked. She did have her CT Scan so I will request that CD for review.

## 2019-01-23 ENCOUNTER — TELEPHONE (OUTPATIENT)
Dept: TRANSPLANT | Facility: CLINIC | Age: 69
End: 2019-01-23

## 2019-01-23 NOTE — TELEPHONE ENCOUNTER
I called to follow up with Ms Rm and unfortunately she has been unable to have her procedure due to her urologist needing his gallbladder out. She will be rescheduled once he is able to return to work. I have asked her to call me when this is done and she verbalized understanding.

## 2019-03-07 ENCOUNTER — TELEPHONE (OUTPATIENT)
Dept: TRANSPLANT | Facility: CLINIC | Age: 69
End: 2019-03-07

## 2019-03-07 NOTE — TELEPHONE ENCOUNTER
I called to follow up with Danielle to see if she has had her urological procedure done. I got voice mail and left a message for a return call.

## 2019-04-01 ENCOUNTER — TELEPHONE (OUTPATIENT)
Dept: TRANSPLANT | Facility: CLINIC | Age: 69
End: 2019-04-01

## 2019-04-01 NOTE — TELEPHONE ENCOUNTER
I called to check on Danielle and her treatment for RCC. She states she had an ablation of her kidney mass and was seen in follow up yesterday by urologist. She was told she will now need to wait 6 months to be sure the cancer is gone. I advised that I will request her records to see where her transplant case goes from here. She verbalized understanding.

## 2019-04-03 ENCOUNTER — TELEPHONE (OUTPATIENT)
Dept: TRANSPLANT | Facility: CLINIC | Age: 69
End: 2019-04-03

## 2019-04-03 NOTE — TELEPHONE ENCOUNTER
I called and spoke with Dr Wood's nurse (urology) about getting records and to talk about needing clearance. She states she will speak with Dr Wood early next week to address the clearance and get the records to me.

## 2019-04-16 ENCOUNTER — TELEPHONE (OUTPATIENT)
Dept: TRANSPLANT | Facility: CLINIC | Age: 69
End: 2019-04-16

## 2019-04-16 NOTE — TELEPHONE ENCOUNTER
----- Message from Aris Dawson Jr., MD sent at 4/16/2019  8:24 AM CDT -----  Bilateral external iliacs have severe calcifications. Likely Prohibitive. Need to discuss at selection.  Aris Dawson Jr    ----- Message -----  From: Monica Malcolm  Sent: 4/15/2019   4:10 PM  To: Aris Dawson Jr., MD    Please review imported Ct Scan film.    Monica

## 2019-04-24 ENCOUNTER — DOCUMENTATION ONLY (OUTPATIENT)
Dept: TRANSPLANT | Facility: CLINIC | Age: 69
End: 2019-04-24

## 2019-04-24 NOTE — NURSING
PHARM.D. PRE-TRANSPLANT NOTE:    This patient's medication therapy was evaluated as part of her pre-transplant evaluation.      The following general pharmacologic concerns were noted: none    The following pharmacologic concerns related to HCV therapy were noted: none      This patient's medication profile was reviewed for contraindications for DAA Hepatitis C therapy:    [x]  No current inducers of CYP 3A4 or PGP  [x]  No amiodarone on this patient's EMR profile in the last 24 months  [x]  No past or current atrial fibrillation on this patient's EMR profile       Current Outpatient Medications   Medication Sig Dispense Refill    calcitRIOL (ROCALTROL) 0.5 MCG Cap Take 0.5 mcg by mouth once daily.      CYANOCOBALAMIN, VITAMIN B-12, INJ Inject as directed. One injection monthly      DULoxetine (CYMBALTA) 60 MG capsule Take 60 mg by mouth once daily.      ferrous sulfate 325 mg (65 mg iron) Tab tablet Take 325 mg by mouth once daily.      furosemide (LASIX) 40 MG tablet Take 40 mg by mouth 2 (two) times daily.      gabapentin (NEURONTIN) 600 MG tablet Take 600 mg by mouth once daily.      hepatitis A and B vaccine, PF, (TWINRIX) 720 MATT unit- 20 mcg/mL Syrg suspension Inject into the muscle. 1 mL 0    oxyCODONE-acetaminophen (PERCOCET) 7.5-325 mg per tablet Take 1 tablet by mouth 2 (two) times daily as needed for Pain.      pravastatin (PRAVACHOL) 20 MG tablet Take 20 mg by mouth every evening.      sodium bicarbonate 650 MG tablet Take 650 mg by mouth 2 (two) times daily.      tiZANidine 4 mg Cap Take 1 capsule by mouth once daily.      vitamin D 1000 units Tab Take 5,000 Units by mouth once daily.       No current facility-administered medications for this visit.        I am available for consultation and can be contacted, as needed by the other members of the Kidney Transplant team.

## 2019-04-26 ENCOUNTER — COMMITTEE REVIEW (OUTPATIENT)
Dept: TRANSPLANT | Facility: CLINIC | Age: 69
End: 2019-04-26

## 2019-04-26 NOTE — LETTER
April 26, 2019    Danielle Rm  [address] [address]      Dear Danielle Rm:  MRN: [id number]    It is the duty of the [facility] Selection Committee to determine which patients are candidates for a transplant. For this reason, our committee has the difficult task of evaluating patients to determine which ones have the greatest chance of having a successful transplant. We are aware of the magnitude of this responsibility, and we approach it with reverence and humility.    It is with regret I inform you that you are not approved as a transplant candidate due to blood vessel disease making it prohibitive to connect a new kidney. Based on this review, we have determined that at this time, you are not a candidate for a transplant at [facility].      The selection committee carefully considers each patient's transplant candidacy and determines whether it is safe to proceed with transplantation on a case-by-case basis using established selection criteria.  At present, the risk of proceeding with an elective transplant surgery has become too high.                                                                               Although the selection committee believes you are not a suitable transplant candidate, you have the option to be evaluated at other transplant centers who may have different selection criteria.  You may request your [facility] records be sent to any center of your choice by contacting our Medical Records Department at [phone].                                                                               Attached is a letter from the [employer] for Organ Sharing (UNOS).  It describes the services and information offered to patients by UNOS and the Organ Procurement and Transplant Network.    The [facility] Selection Committee sincerely wishes you the best and remains available to answer any questions.  Please do not hesitate to contact our pre-transplant office if we  can assist you in any other way.                                                                               Sincerely,      Bettina Mayfield MD  Medical Director, Kidney & Kidney/Pancreas Transplantation    [facility]/LD/    Encl: UNOS Letter          OPTN/UNOS: Your Resource for Organ Transplant Information        If you have a question regarding your own medical care, you always should call your transplant center first. However, for general organ transplant-related information, you can call the [employer] Organ [employer]aring (UNOS) toll-free patient services line at 1-443.738.6300.    Anyone, including potential transplant candidates, recipients, family members/friends, living donors, and/or donor family members can call this number to:    · talk about organ donation, living donation, how transplant and donation work, the donation process, transplant policies, and transplant/donor information;  · get a free patient information kit with helpful booklets, waiting list and transplant information, and a list of all transplant centers;  · ask questions about the Organ Procurement and Transplantation Network (OPTN) web site (www.optn.transplant.hrsa.gov); the UNOS Web site (www.unos.org); or the UNOS web site for living donors and transplant recipients (www.transplantliving.org);  · learn how UNOS and the OPTN can help you;  · talk about any concerns that you may have with a transplant center and how they perform    UNOS is a not-for-profit organization that provides all of the administrative services for the national OPTN under federal contract to the Health Resources and Services Administration (HRSA), an agency under the U.S. Department of Health and Human Services (HHS).     UNOS and OPTN responsibilities include:    · writing educational material for patients, the public and professionals;  · helping to make people aware of the need for donated organs and tissue;  · writing organ transplant policy with help  from doctors, nurses, transplant patients/candidates, donor families, living donors, and the public;  · coordinating the organ matching and placement process;  · collecting information about every organ transplant and donation that occurs in the [place].    Remember, you should contact your transplant center directly if you have questions or concerns about your own medical care including medical records, work-up progress and test reports. [facility] is not your transplant center, and staff at [facility] will not be able to transfer you to your transplant center, so keep your transplant centers phone number handy. But, while you research your transplant needs and learn as much as you can about transplantation and donation, we welcome your call to our toll-free patient services line at 1-468.946.2004.

## 2019-04-26 NOTE — COMMITTEE REVIEW
Native Organ Dx: Diabetes Mellitus - Type Other / Unknown      Not approved for LRD/CAD transplant due to extensive vascular calcifications.    I spoke with Ms Rm about committee decision and she verbalized understanding.    Note written by Monica Malcolm RN    ===============================================    I was present at the meeting and attest to the decision of the committee.